# Patient Record
Sex: FEMALE | Race: BLACK OR AFRICAN AMERICAN | Employment: FULL TIME | URBAN - METROPOLITAN AREA
[De-identification: names, ages, dates, MRNs, and addresses within clinical notes are randomized per-mention and may not be internally consistent; named-entity substitution may affect disease eponyms.]

---

## 2019-09-13 LAB
CHLAMYDIA, EXTERNAL: NEGATIVE
HBSAG, EXTERNAL: NEGATIVE
HIV, EXTERNAL: NEGATIVE
N. GONORRHEA, EXTERNAL: NEGATIVE
RPR, EXTERNAL: NORMAL
RUBELLA, EXTERNAL: NORMAL

## 2019-10-24 PROBLEM — Z34.91 SUPERVISION OF NORMAL PREGNANCY IN FIRST TRIMESTER: Status: ACTIVE | Noted: 2019-10-24

## 2019-12-20 PROBLEM — O44.20 PARTIAL PREVIA: Status: ACTIVE | Noted: 2019-12-20

## 2019-12-24 ENCOUNTER — HOSPITAL ENCOUNTER (EMERGENCY)
Age: 29
Discharge: HOME | End: 2019-12-25
Attending: EMERGENCY MEDICINE
Payer: COMMERCIAL

## 2019-12-24 DIAGNOSIS — R51.9 PREGNANCY HEADACHE IN SECOND TRIMESTER: Primary | ICD-10-CM

## 2019-12-24 DIAGNOSIS — O26.892 PREGNANCY HEADACHE IN SECOND TRIMESTER: Primary | ICD-10-CM

## 2019-12-24 LAB
ALBUMIN SERPL-MCNC: 3.1 G/DL (ref 3.4–5)
ALBUMIN/GLOB SERPL: 0.8 {RATIO} (ref 0.8–1.7)
ALP SERPL-CCNC: 36 U/L (ref 45–117)
ALT SERPL-CCNC: 16 U/L (ref 13–56)
ANION GAP SERPL CALC-SCNC: 9 MMOL/L (ref 3–18)
APPEARANCE UR: CLEAR
AST SERPL-CCNC: 17 U/L (ref 10–38)
BASOPHILS # BLD: 0 K/UL (ref 0–0.1)
BASOPHILS NFR BLD: 0 % (ref 0–2)
BILIRUB SERPL-MCNC: 0.2 MG/DL (ref 0.2–1)
BILIRUB UR QL: NEGATIVE
BUN SERPL-MCNC: 8 MG/DL (ref 7–18)
BUN/CREAT SERPL: 13 (ref 12–20)
CALCIUM SERPL-MCNC: 8.8 MG/DL (ref 8.5–10.1)
CHLORIDE SERPL-SCNC: 107 MMOL/L (ref 100–111)
CO2 SERPL-SCNC: 22 MMOL/L (ref 21–32)
COLOR UR: YELLOW
CREAT SERPL-MCNC: 0.6 MG/DL (ref 0.6–1.3)
DIFFERENTIAL METHOD BLD: ABNORMAL
EOSINOPHIL # BLD: 0 K/UL (ref 0–0.4)
EOSINOPHIL NFR BLD: 0 % (ref 0–5)
ERYTHROCYTE [DISTWIDTH] IN BLOOD BY AUTOMATED COUNT: 12.9 % (ref 11.6–14.5)
GLOBULIN SER CALC-MCNC: 4 G/DL (ref 2–4)
GLUCOSE SERPL-MCNC: 76 MG/DL (ref 74–99)
GLUCOSE UR STRIP.AUTO-MCNC: NEGATIVE MG/DL
HCT VFR BLD AUTO: 36.7 % (ref 35–45)
HGB BLD-MCNC: 12.2 G/DL (ref 12–16)
HGB UR QL STRIP: NEGATIVE
KETONES UR QL STRIP.AUTO: NEGATIVE MG/DL
LEUKOCYTE ESTERASE UR QL STRIP.AUTO: NEGATIVE
LYMPHOCYTES # BLD: 3.2 K/UL (ref 0.9–3.6)
LYMPHOCYTES NFR BLD: 36 % (ref 21–52)
MCH RBC QN AUTO: 30.5 PG (ref 24–34)
MCHC RBC AUTO-ENTMCNC: 33.2 G/DL (ref 31–37)
MCV RBC AUTO: 91.8 FL (ref 74–97)
MONOCYTES # BLD: 0.6 K/UL (ref 0.05–1.2)
MONOCYTES NFR BLD: 7 % (ref 3–10)
NEUTS SEG # BLD: 5 K/UL (ref 1.8–8)
NEUTS SEG NFR BLD: 57 % (ref 40–73)
NITRITE UR QL STRIP.AUTO: NEGATIVE
PH UR STRIP: 6 [PH] (ref 5–8)
PLATELET # BLD AUTO: 148 K/UL (ref 135–420)
PMV BLD AUTO: 12 FL (ref 9.2–11.8)
POTASSIUM SERPL-SCNC: 3.9 MMOL/L (ref 3.5–5.5)
PROT SERPL-MCNC: 7.1 G/DL (ref 6.4–8.2)
PROT UR STRIP-MCNC: NEGATIVE MG/DL
RBC # BLD AUTO: 4 M/UL (ref 4.2–5.3)
SODIUM SERPL-SCNC: 138 MMOL/L (ref 136–145)
SP GR UR REFRACTOMETRY: 1 (ref 1–1.03)
UROBILINOGEN UR QL STRIP.AUTO: 0.2 EU/DL (ref 0.2–1)
WBC # BLD AUTO: 8.8 K/UL (ref 4.6–13.2)

## 2019-12-24 PROCEDURE — 96375 TX/PRO/DX INJ NEW DRUG ADDON: CPT

## 2019-12-24 PROCEDURE — 81003 URINALYSIS AUTO W/O SCOPE: CPT

## 2019-12-24 PROCEDURE — 85025 COMPLETE CBC W/AUTO DIFF WBC: CPT

## 2019-12-24 PROCEDURE — 74011250636 HC RX REV CODE- 250/636: Performed by: EMERGENCY MEDICINE

## 2019-12-24 PROCEDURE — 96365 THER/PROPH/DIAG IV INF INIT: CPT

## 2019-12-24 PROCEDURE — 80053 COMPREHEN METABOLIC PANEL: CPT

## 2019-12-24 PROCEDURE — 99284 EMERGENCY DEPT VISIT MOD MDM: CPT

## 2019-12-24 PROCEDURE — 96361 HYDRATE IV INFUSION ADD-ON: CPT

## 2019-12-24 RX ORDER — DIPHENHYDRAMINE HYDROCHLORIDE 50 MG/ML
50 INJECTION, SOLUTION INTRAMUSCULAR; INTRAVENOUS
Status: COMPLETED | OUTPATIENT
Start: 2019-12-24 | End: 2019-12-24

## 2019-12-24 RX ORDER — METOCLOPRAMIDE HYDROCHLORIDE 5 MG/ML
10 INJECTION INTRAMUSCULAR; INTRAVENOUS
Status: COMPLETED | OUTPATIENT
Start: 2019-12-24 | End: 2019-12-24

## 2019-12-24 RX ORDER — ACETAMINOPHEN 10 MG/ML
1000 INJECTION, SOLUTION INTRAVENOUS ONCE
Status: COMPLETED | OUTPATIENT
Start: 2019-12-24 | End: 2019-12-24

## 2019-12-24 RX ADMIN — ACETAMINOPHEN 1000 MG: 10 INJECTION, SOLUTION INTRAVENOUS at 22:43

## 2019-12-24 RX ADMIN — SODIUM CHLORIDE 1000 ML: 900 INJECTION, SOLUTION INTRAVENOUS at 22:43

## 2019-12-24 RX ADMIN — DIPHENHYDRAMINE HYDROCHLORIDE 50 MG: 50 INJECTION INTRAMUSCULAR; INTRAVENOUS at 23:43

## 2019-12-24 RX ADMIN — METOCLOPRAMIDE 10 MG: 5 INJECTION, SOLUTION INTRAMUSCULAR; INTRAVENOUS at 22:43

## 2019-12-25 ENCOUNTER — APPOINTMENT (OUTPATIENT)
Dept: CT IMAGING | Age: 29
End: 2019-12-25
Attending: EMERGENCY MEDICINE
Payer: COMMERCIAL

## 2019-12-25 VITALS
HEIGHT: 64 IN | WEIGHT: 140 LBS | OXYGEN SATURATION: 100 % | SYSTOLIC BLOOD PRESSURE: 106 MMHG | TEMPERATURE: 97.4 F | RESPIRATION RATE: 18 BRPM | BODY MASS INDEX: 23.9 KG/M2 | HEART RATE: 80 BPM | DIASTOLIC BLOOD PRESSURE: 64 MMHG

## 2019-12-25 PROBLEM — R51.9 PREGNANCY HEADACHE IN SECOND TRIMESTER: Status: ACTIVE | Noted: 2019-12-25

## 2019-12-25 PROBLEM — O26.892 PREGNANCY HEADACHE IN SECOND TRIMESTER: Status: ACTIVE | Noted: 2019-12-25

## 2019-12-25 PROCEDURE — 96372 THER/PROPH/DIAG INJ SC/IM: CPT

## 2019-12-25 PROCEDURE — 70450 CT HEAD/BRAIN W/O DYE: CPT

## 2019-12-25 PROCEDURE — 74011636637 HC RX REV CODE- 636/637: Performed by: EMERGENCY MEDICINE

## 2019-12-25 RX ORDER — SUMATRIPTAN 6 MG/.5ML
6 INJECTION, SOLUTION SUBCUTANEOUS ONCE
Status: COMPLETED | OUTPATIENT
Start: 2019-12-25 | End: 2019-12-25

## 2019-12-25 RX ADMIN — SUMATRIPTAN 6 MG: 6 INJECTION SUBCUTANEOUS at 00:18

## 2019-12-25 NOTE — ED PROVIDER NOTES
EMERGENCY DEPARTMENT HISTORY AND PHYSICAL EXAM    Date: 12/24/2019  Patient Name: Nicolas Valiente    History of Presenting Illness     Chief Complaint   Patient presents with    Headache         History Provided By: Patient      Nicolas Valiente is a 30 y.o. female who presents to the emergency department C/O headache during pregnancy. Patient states the headache is been going on for last 24 hours. She states she is approximately 22 weeks pregnant. She denies any abdominal pain, abdominal cramping, pelvic pain, vaginal bleeding or discharge or dysuria. States she has been having some nausea and vomiting and states that the headache is worse with bright lights and loud noises. She localizes the headache as a band surrounding her head. She states she does see OB/GYN Dr. Cyndie Lazar at [facility] and has had no complications during this pregnancy. Denies any blurry vision. PCP: None    Current Outpatient Medications   Medication Sig Dispense Refill    prenatal no. 44-iron,car-FA-dha (PRENATE MINI) 29 mg iron-1 mg -350 mg cap Take 1 Tab by mouth daily. Indications: LOT [id number] EXP 06/2019 9 Cap 0       Past History     Past Medical History:  Past Medical History:   Diagnosis Date    Anemia     Polydactyly of both hands        Past Surgical History:  No past surgical history on file. Family History:  Family History   Problem Relation Age of Onset    Diabetes Mother     Diabetes Maternal Grandmother        Social History:  Social History     Tobacco Use    Smoking status: Never Smoker    Smokeless tobacco: Never Used   Substance Use Topics    Alcohol use: Not Currently    Drug use: Never       Allergies: Allergies   Allergen Reactions    Apple Other (comments)         Review of Systems   Review of Systems   Constitutional: Negative for fever. Respiratory: Negative for shortness of breath. Cardiovascular: Negative for chest pain. Gastrointestinal: Negative for abdominal pain. Musculoskeletal: Negative for neck pain and neck stiffness. Neurological: Positive for headaches. Negative for dizziness, syncope and weakness. Physical Exam     Vitals:    12/24/19 2218 12/24/19 2248 12/25/19 0015   BP: 108/74  106/76   Pulse: 90  82   Resp: 18     Temp: 97.4 °F (36.3 °C)     SpO2: 99% 100% 100%   Weight: 63.5 kg (140 lb)     Height: 5' 4\" (1.626 m)       Physical Exam    Nursing notes and vital signs reviewed    Constitutional: Nontoxic but uncomfortable appearing in mild distress  HEENT: Normocephalic, Atraumatic, Pupils are equal, round, and reactive to light, EOMI  Cardiovascular: Regular rate and rhythm, no murmurs  Chest: Normal work of breathing and chest excursion bilaterally  Lungs: Clear to ausculation bilaterally  Abdomen: Soft, non tender, distention appropriate for approximately 22 weeks gestational age, normoactive bowel sounds  Back: No evidence of trauma or deformity  Extremities: No evidence of trauma or deformity, no LE edema  Skin: Warm and dry, normal cap refill  Neuro: Alert and oriented x 3, CN intact, normal speech, strength and sensation full and symmetric bilaterally, normal coordination, no focal deficits  Psychiatric: Patient is tearful      Diagnostic Study Results     Labs -     Recent Results (from the past 72 hour(s))   CBC WITH AUTOMATED DIFF    Collection Time: 12/24/19 10:45 PM   Result Value Ref Range    WBC 8.8 4.6 - 13.2 K/uL    RBC 4.00 (L) 4.20 - 5.30 M/uL    HGB 12.2 12.0 - 16.0 g/dL    HCT 36.7 35.0 - 45.0 %    MCV 91.8 74.0 - 97.0 FL    MCH 30.5 24.0 - 34.0 PG    MCHC 33.2 31.0 - 37.0 g/dL    RDW 12.9 11.6 - 14.5 %    PLATELET 047 286 - 952 K/uL    MPV 12.0 (H) 9.2 - 11.8 FL    NEUTROPHILS 57 40 - 73 %    LYMPHOCYTES 36 21 - 52 %    MONOCYTES 7 3 - 10 %    EOSINOPHILS 0 0 - 5 %    BASOPHILS 0 0 - 2 %    ABS. NEUTROPHILS 5.0 1.8 - 8.0 K/UL    ABS. LYMPHOCYTES 3.2 0.9 - 3.6 K/UL    ABS. MONOCYTES 0.6 0.05 - 1.2 K/UL    ABS.  EOSINOPHILS 0.0 0.0 - 0.4 K/UL    ABS. BASOPHILS 0.0 0.0 - 0.1 K/UL    DF AUTOMATED     METABOLIC PANEL, COMPREHENSIVE    Collection Time: 12/24/19 10:45 PM   Result Value Ref Range    Sodium 138 136 - 145 mmol/L    Potassium 3.9 3.5 - 5.5 mmol/L    Chloride 107 100 - 111 mmol/L    CO2 22 21 - 32 mmol/L    Anion gap 9 3.0 - 18 mmol/L    Glucose 76 74 - 99 mg/dL    BUN 8 7.0 - 18 MG/DL    Creatinine 0.60 0.6 - 1.3 MG/DL    BUN/Creatinine ratio 13 12 - 20      GFR est AA >60 >60 ml/min/1.73m2    GFR est non-AA >60 >60 ml/min/1.73m2    Calcium 8.8 8.5 - 10.1 MG/DL    Bilirubin, total 0.2 0.2 - 1.0 MG/DL    ALT (SGPT) 16 13 - 56 U/L    AST (SGOT) 17 10 - 38 U/L    Alk. phosphatase 36 (L) 45 - 117 U/L    Protein, total 7.1 6.4 - 8.2 g/dL    Albumin 3.1 (L) 3.4 - 5.0 g/dL    Globulin 4.0 2.0 - 4.0 g/dL    A-G Ratio 0.8 0.8 - 1.7     URINALYSIS W/ RFLX MICROSCOPIC    Collection Time: 12/24/19 11:00 PM   Result Value Ref Range    Color YELLOW      Appearance CLEAR      Specific gravity 1.005 1.005 - 1.030      pH (UA) 6.0 5.0 - 8.0      Protein NEGATIVE  NEG mg/dL    Glucose NEGATIVE  NEG mg/dL    Ketone NEGATIVE  NEG mg/dL    Bilirubin NEGATIVE  NEG      Blood NEGATIVE  NEG      Urobilinogen 0.2 0.2 - 1.0 EU/dL    Nitrites NEGATIVE  NEG      Leukocyte Esterase NEGATIVE  NEG         Radiologic Studies -   CT HEAD WO CONT   Final Result   IMPRESSION:      No acute intracranial abnormalities. CT Results  (Last 48 hours)               12/25/19 0122  CT HEAD WO CONT Final result    Impression:  IMPRESSION:       No acute intracranial abnormalities. Narrative:  EXAM: CT head       INDICATION: Headache. COMPARISON: None. TECHNIQUE: Axial CT imaging of the head was performed without intravenous   contrast. Dose reduction techniques used: automated exposure control, adjustment   of the mAs and/or kVp according to patient size, and iterative reconstruction   techniques.        _______________       FINDINGS:       BRAIN AND POSTERIOR FOSSA: The sulci, folia, ventricles and basal cisterns are   within normal limits for the patient's age. There is no intracranial hemorrhage,   mass effect, or midline shift. There are no areas of abnormal parenchymal   attenuation. EXTRA-AXIAL SPACES AND MENINGES: There are no abnormal extra-axial fluid   collections. CALVARIUM: Intact. SINUSES: Clear. OTHER: None.       _______________               CXR Results  (Last 48 hours)    None          Medications given in the ED-  Medications   sodium chloride 0.9 % bolus infusion 1,000 mL (0 mL IntraVENous IV Completed 12/25/19 0018)   acetaminophen (OFIRMEV) infusion 1,000 mg (0 mg IntraVENous IV Completed 12/24/19 2304)   metoclopramide HCl (REGLAN) injection 10 mg (10 mg IntraVENous Given 12/24/19 2243)   diphenhydrAMINE (BENADRYL) injection 50 mg (50 mg IntraVENous Given 12/24/19 2343)   SUMAtriptan (IMITREX) injection 6 mg (6 mg SubCUTAneous Given 12/25/19 0018)         Medical Decision Making   I am the first provider for this patient. I reviewed the vital signs, available nursing notes, past medical history, past surgical history, family history and social history. Vital Signs-Reviewed the patient's vital signs. Pulse Oximetry Analysis - 100% on room air     Cardiac Monitor:  Rate: 90 bpm  Rhythm: sinus    Records Reviewed: Nursing Notes    Procedures:  Procedures    ED Course:   Patient was given 1 L fluid bolus, 1 g of Tylenol, 10 mg of Reglan and 50 mg of Benadryl. Patient states the headache is a little bit better but still pretty bad. At this time will obtain CT scan to rule out any potential intracranial process and try subcutaneous Imitrex 6 mg    CT scan normal, laboratory findings unremarkable, urinary studies unremarkable. Patient states her headache is improving with the Imitrex and feels comfortable going home. I discussed with the patient that there are symptoms appear as a migraine headache. Recommended to continue with rest, fluid rehydration, over-the-counter Tylenol as well as any other prescription medications as directed. Recommended to follow-up with their primary care physician and/or neurologist for reevaluation and long-term management of their headaches. Recommended to come back to the emergency department the patient develops worsening other symptoms or signs of numbness or weakness. They understand and agree to plan. Diagnosis and Disposition     DISCHARGE NOTE:    Avni Marquis  results have been reviewed with her. She has been counseled regarding her diagnosis, treatment, and plan. She verbally conveys understanding and agreement of the signs, symptoms, diagnosis, treatment and prognosis and additionally agrees to follow up as discussed. She also agrees with the care-plan and conveys that all of her questions have been answered. I have also provided discharge instructions for her that include: educational information regarding their diagnosis and treatment, and list of reasons why they would want to return to the ED prior to their follow-up appointment, should her condition change. She has been provided with education for proper emergency department utilization. CLINICAL IMPRESSION:    1. Pregnancy headache in second trimester        PLAN:  1. D/C Home  2. Current Discharge Medication List        3. Follow-up Information     Follow up With Specialties Details Why Contact Info    Your obgyn  Schedule an appointment as soon as possible for a visit in 2 days As needed     THE [facility] EMERGENCY DEPT Emergency Medicine Go to  If symptoms worsen [address] [facility] [id number] [phone]        _______________________________      Please note that this dictation was completed with [employer], the computer voice recognition software.   Quite often unanticipated grammatical, syntax, homophones, and other interpretive errors are inadvertently transcribed by the computer software. Please disregard these errors. Please excuse any errors that have escaped final proofreading.

## 2019-12-25 NOTE — ED TRIAGE NOTES
Pt c/o H/A all day;  C/o nausea;   No h/o migraines;  Pt 22 weeks pregnant; OB ANJUM'brian at [facility]

## 2020-03-30 ENCOUNTER — ANESTHESIA EVENT (OUTPATIENT)
Dept: LABOR AND DELIVERY | Age: 30
End: 2020-03-30
Payer: COMMERCIAL

## 2020-03-31 ENCOUNTER — ANESTHESIA (OUTPATIENT)
Dept: LABOR AND DELIVERY | Age: 30
End: 2020-03-31
Payer: COMMERCIAL

## 2020-03-31 ENCOUNTER — HOSPITAL ENCOUNTER (INPATIENT)
Age: 30
LOS: 2 days | Discharge: HOME | End: 2020-04-02
Attending: OBSTETRICS & GYNECOLOGY | Admitting: OBSTETRICS & GYNECOLOGY
Payer: COMMERCIAL

## 2020-03-31 DIAGNOSIS — Z98.891 S/P CESAREAN SECTION: Primary | ICD-10-CM

## 2020-03-31 PROBLEM — O44.03 PLACENTA PREVIA ANTEPARTUM IN THIRD TRIMESTER: Status: ACTIVE | Noted: 2020-03-31

## 2020-03-31 LAB
BASOPHILS # BLD: 0 K/UL (ref 0–0.1)
BASOPHILS NFR BLD: 0 % (ref 0–2)
DIFFERENTIAL METHOD BLD: ABNORMAL
EOSINOPHIL # BLD: 0.1 K/UL (ref 0–0.4)
EOSINOPHIL NFR BLD: 1 % (ref 0–5)
ERYTHROCYTE [DISTWIDTH] IN BLOOD BY AUTOMATED COUNT: 13.3 % (ref 11.6–14.5)
HCT VFR BLD AUTO: 36.4 % (ref 35–45)
HGB BLD-MCNC: 11.9 G/DL (ref 12–16)
LYMPHOCYTES # BLD: 2.7 K/UL (ref 0.9–3.6)
LYMPHOCYTES NFR BLD: 43 % (ref 21–52)
MCH RBC QN AUTO: 30.6 PG (ref 24–34)
MCHC RBC AUTO-ENTMCNC: 32.7 G/DL (ref 31–37)
MCV RBC AUTO: 93.6 FL (ref 74–97)
MONOCYTES # BLD: 0.4 K/UL (ref 0.05–1.2)
MONOCYTES NFR BLD: 6 % (ref 3–10)
NEUTS SEG # BLD: 3.1 K/UL (ref 1.8–8)
NEUTS SEG NFR BLD: 50 % (ref 40–73)
PLATELET # BLD AUTO: 111 K/UL (ref 135–420)
PMV BLD AUTO: 13.6 FL (ref 9.2–11.8)
RBC # BLD AUTO: 3.89 M/UL (ref 4.2–5.3)
WBC # BLD AUTO: 6.3 K/UL (ref 4.6–13.2)

## 2020-03-31 PROCEDURE — 77030027138 HC INCENT SPIROMETER -A

## 2020-03-31 PROCEDURE — 75410000003 HC RECOV DEL/VAG/CSECN EA 0.5 HR

## 2020-03-31 PROCEDURE — 77030031139 HC SUT VCRL2 J&J -A: Performed by: OBSTETRICS & GYNECOLOGY

## 2020-03-31 PROCEDURE — 59025 FETAL NON-STRESS TEST: CPT

## 2020-03-31 PROCEDURE — 86900 BLOOD TYPING SEROLOGIC ABO: CPT

## 2020-03-31 PROCEDURE — 74011250636 HC RX REV CODE- 250/636

## 2020-03-31 PROCEDURE — 77030011265 HC ELECTRD BLD HEX COVD -A: Performed by: OBSTETRICS & GYNECOLOGY

## 2020-03-31 PROCEDURE — 77030040361 HC SLV COMPR DVT MDII -B: Performed by: OBSTETRICS & GYNECOLOGY

## 2020-03-31 PROCEDURE — 74011250636 HC RX REV CODE- 250/636: Performed by: OBSTETRICS & GYNECOLOGY

## 2020-03-31 PROCEDURE — 77030039266 HC ADH SKN EXOFIN S2SG -A: Performed by: OBSTETRICS & GYNECOLOGY

## 2020-03-31 PROCEDURE — 77030040361 HC SLV COMPR DVT MDII -B

## 2020-03-31 PROCEDURE — 76060000032 HC ANESTHESIA 0.5 TO 1 HR: Performed by: OBSTETRICS & GYNECOLOGY

## 2020-03-31 PROCEDURE — 75410000003 HC RECOV DEL/VAG/CSECN EA 0.5 HR: Performed by: OBSTETRICS & GYNECOLOGY

## 2020-03-31 PROCEDURE — 74011250636 HC RX REV CODE- 250/636: Performed by: ANESTHESIOLOGY

## 2020-03-31 PROCEDURE — 77030018836 HC SOL IRR NACL ICUM -A: Performed by: OBSTETRICS & GYNECOLOGY

## 2020-03-31 PROCEDURE — 65270000029 HC RM PRIVATE

## 2020-03-31 PROCEDURE — 74011000250 HC RX REV CODE- 250: Performed by: ANESTHESIOLOGY

## 2020-03-31 PROCEDURE — 77030040830 HC CATH URETH FOL MDII -A

## 2020-03-31 PROCEDURE — 85025 COMPLETE CBC W/AUTO DIFF WBC: CPT

## 2020-03-31 PROCEDURE — 76010000391 HC C SECN FIRST 1 HR: Performed by: OBSTETRICS & GYNECOLOGY

## 2020-03-31 PROCEDURE — 87081 CULTURE SCREEN ONLY: CPT

## 2020-03-31 PROCEDURE — 77030010507 HC ADH SKN DERMBND J&J -B: Performed by: OBSTETRICS & GYNECOLOGY

## 2020-03-31 PROCEDURE — 76060000078 HC EPIDURAL ANESTHESIA

## 2020-03-31 PROCEDURE — 86920 COMPATIBILITY TEST SPIN: CPT

## 2020-03-31 PROCEDURE — 74011250637 HC RX REV CODE- 250/637: Performed by: OBSTETRICS & GYNECOLOGY

## 2020-03-31 PROCEDURE — 77030014144 HC TY SPN ANES BBMI -B: Performed by: ANESTHESIOLOGY

## 2020-03-31 PROCEDURE — 77030008459 HC STPLR SKN COOP -B: Performed by: OBSTETRICS & GYNECOLOGY

## 2020-03-31 PROCEDURE — 77030018809 HC RETRCTR ALXSO DISP AMR -B: Performed by: OBSTETRICS & GYNECOLOGY

## 2020-03-31 RX ORDER — SODIUM CHLORIDE, SODIUM LACTATE, POTASSIUM CHLORIDE, CALCIUM CHLORIDE 600; 310; 30; 20 MG/100ML; MG/100ML; MG/100ML; MG/100ML
125 INJECTION, SOLUTION INTRAVENOUS CONTINUOUS
Status: DISPENSED | OUTPATIENT
Start: 2020-03-31 | End: 2020-04-01

## 2020-03-31 RX ORDER — OXYCODONE AND ACETAMINOPHEN 5; 325 MG/1; MG/1
1-2 TABLET ORAL
Status: DISCONTINUED | OUTPATIENT
Start: 2020-03-31 | End: 2020-04-02 | Stop reason: HOSPADM

## 2020-03-31 RX ORDER — ONDANSETRON 2 MG/ML
INJECTION INTRAMUSCULAR; INTRAVENOUS AS NEEDED
Status: DISCONTINUED | OUTPATIENT
Start: 2020-03-31 | End: 2020-03-31 | Stop reason: HOSPADM

## 2020-03-31 RX ORDER — FENTANYL CITRATE 50 UG/ML
INJECTION, SOLUTION INTRAMUSCULAR; INTRAVENOUS
Status: COMPLETED | OUTPATIENT
Start: 2020-03-31 | End: 2020-03-31

## 2020-03-31 RX ORDER — SODIUM CHLORIDE 0.9 % (FLUSH) 0.9 %
5-40 SYRINGE (ML) INJECTION AS NEEDED
Status: DISCONTINUED | OUTPATIENT
Start: 2020-03-31 | End: 2020-04-02 | Stop reason: HOSPADM

## 2020-03-31 RX ORDER — CEFAZOLIN SODIUM 2 G/50ML
2 SOLUTION INTRAVENOUS ONCE
Status: COMPLETED | OUTPATIENT
Start: 2020-03-31 | End: 2020-03-31

## 2020-03-31 RX ORDER — FACIAL-BODY WIPES
10 EACH TOPICAL
Status: DISCONTINUED | OUTPATIENT
Start: 2020-03-31 | End: 2020-04-02 | Stop reason: HOSPADM

## 2020-03-31 RX ORDER — SODIUM CHLORIDE, SODIUM LACTATE, POTASSIUM CHLORIDE, CALCIUM CHLORIDE 600; 310; 30; 20 MG/100ML; MG/100ML; MG/100ML; MG/100ML
125 INJECTION, SOLUTION INTRAVENOUS CONTINUOUS
Status: DISCONTINUED | OUTPATIENT
Start: 2020-03-31 | End: 2020-03-31 | Stop reason: HOSPADM

## 2020-03-31 RX ORDER — BUPIVACAINE HYDROCHLORIDE 7.5 MG/ML
INJECTION, SOLUTION INTRASPINAL
Status: COMPLETED | OUTPATIENT
Start: 2020-03-31 | End: 2020-03-31

## 2020-03-31 RX ORDER — ZOLPIDEM TARTRATE 5 MG/1
5 TABLET ORAL
Status: DISCONTINUED | OUTPATIENT
Start: 2020-03-31 | End: 2020-04-02 | Stop reason: HOSPADM

## 2020-03-31 RX ORDER — SODIUM CHLORIDE 9 MG/ML
250 INJECTION, SOLUTION INTRAVENOUS AS NEEDED
Status: DISCONTINUED | OUTPATIENT
Start: 2020-03-31 | End: 2020-03-31 | Stop reason: HOSPADM

## 2020-03-31 RX ORDER — IBUPROFEN 400 MG/1
800 TABLET ORAL
Status: DISCONTINUED | OUTPATIENT
Start: 2020-04-03 | End: 2020-04-01

## 2020-03-31 RX ORDER — PHENYLEPHRINE HCL IN 0.9% NACL 1 MG/10 ML
SYRINGE (ML) INTRAVENOUS AS NEEDED
Status: DISCONTINUED | OUTPATIENT
Start: 2020-03-31 | End: 2020-03-31 | Stop reason: HOSPADM

## 2020-03-31 RX ORDER — KETOROLAC TROMETHAMINE 30 MG/ML
30 INJECTION, SOLUTION INTRAMUSCULAR; INTRAVENOUS EVERY 6 HOURS
Status: DISCONTINUED | OUTPATIENT
Start: 2020-03-31 | End: 2020-04-01

## 2020-03-31 RX ORDER — OXYTOCIN/0.9 % SODIUM CHLORIDE 20/1000 ML
125 PLASTIC BAG, INJECTION (ML) INTRAVENOUS CONTINUOUS
Status: DISCONTINUED | OUTPATIENT
Start: 2020-03-31 | End: 2020-04-02 | Stop reason: HOSPADM

## 2020-03-31 RX ORDER — SODIUM CHLORIDE 0.9 % (FLUSH) 0.9 %
5-40 SYRINGE (ML) INJECTION EVERY 8 HOURS
Status: DISCONTINUED | OUTPATIENT
Start: 2020-03-31 | End: 2020-04-02 | Stop reason: HOSPADM

## 2020-03-31 RX ORDER — PROMETHAZINE HYDROCHLORIDE 25 MG/ML
25 INJECTION, SOLUTION INTRAMUSCULAR; INTRAVENOUS
Status: DISCONTINUED | OUTPATIENT
Start: 2020-03-31 | End: 2020-04-02 | Stop reason: HOSPADM

## 2020-03-31 RX ORDER — SIMETHICONE 80 MG
80 TABLET,CHEWABLE ORAL
Status: DISCONTINUED | OUTPATIENT
Start: 2020-03-31 | End: 2020-04-02 | Stop reason: HOSPADM

## 2020-03-31 RX ORDER — MORPHINE SULFATE 1 MG/ML
INJECTION, SOLUTION EPIDURAL; INTRATHECAL; INTRAVENOUS
Status: COMPLETED | OUTPATIENT
Start: 2020-03-31 | End: 2020-03-31

## 2020-03-31 RX ORDER — ACETAMINOPHEN 325 MG/1
650 TABLET ORAL
Status: DISCONTINUED | OUTPATIENT
Start: 2020-03-31 | End: 2020-04-02 | Stop reason: HOSPADM

## 2020-03-31 RX ORDER — OXYTOCIN/RINGER'S LACTATE 20/1000 ML
PLASTIC BAG, INJECTION (ML) INTRAVENOUS
Status: DISCONTINUED | OUTPATIENT
Start: 2020-03-31 | End: 2020-03-31 | Stop reason: HOSPADM

## 2020-03-31 RX ORDER — OXYTOCIN/0.9 % SODIUM CHLORIDE 20/1000 ML
PLASTIC BAG, INJECTION (ML) INTRAVENOUS
Status: COMPLETED
Start: 2020-03-31 | End: 2020-03-31

## 2020-03-31 RX ADMIN — Medication 125 ML/HR: at 10:30

## 2020-03-31 RX ADMIN — ONDANSETRON HYDROCHLORIDE 4 MG: 2 INJECTION INTRAMUSCULAR; INTRAVENOUS at 09:32

## 2020-03-31 RX ADMIN — FENTANYL CITRATE 25 MCG: 50 INJECTION, SOLUTION INTRAMUSCULAR; INTRAVENOUS at 09:14

## 2020-03-31 RX ADMIN — SODIUM CHLORIDE, SODIUM LACTATE, POTASSIUM CHLORIDE, AND CALCIUM CHLORIDE: 600; 310; 30; 20 INJECTION, SOLUTION INTRAVENOUS at 09:08

## 2020-03-31 RX ADMIN — Medication 100 MCG: at 09:27

## 2020-03-31 RX ADMIN — KETOROLAC TROMETHAMINE 30 MG: 30 INJECTION, SOLUTION INTRAMUSCULAR at 10:48

## 2020-03-31 RX ADMIN — KETOROLAC TROMETHAMINE 30 MG: 30 INJECTION, SOLUTION INTRAMUSCULAR at 17:52

## 2020-03-31 RX ADMIN — SODIUM CHLORIDE, SODIUM LACTATE, POTASSIUM CHLORIDE, AND CALCIUM CHLORIDE 1000 ML: 600; 310; 30; 20 INJECTION, SOLUTION INTRAVENOUS at 07:30

## 2020-03-31 RX ADMIN — MORPHINE SULFATE 0.15 MG: 1 INJECTION, SOLUTION EPIDURAL; INTRATHECAL; INTRAVENOUS at 09:14

## 2020-03-31 RX ADMIN — Medication 200 MCG: at 09:18

## 2020-03-31 RX ADMIN — Medication 1000 ML/HR: at 09:30

## 2020-03-31 RX ADMIN — SODIUM CHLORIDE, SODIUM LACTATE, POTASSIUM CHLORIDE, AND CALCIUM CHLORIDE 125 ML/HR: 600; 310; 30; 20 INJECTION, SOLUTION INTRAVENOUS at 10:48

## 2020-03-31 RX ADMIN — BUPIVACAINE HYDROCHLORIDE IN DEXTROSE 15 MG: 7.5 INJECTION, SOLUTION SUBARACHNOID at 09:14

## 2020-03-31 RX ADMIN — OXYCODONE HYDROCHLORIDE AND ACETAMINOPHEN 2 TABLET: 5; 325 TABLET ORAL at 15:33

## 2020-03-31 RX ADMIN — CEFAZOLIN SODIUM 2 G: 2 SOLUTION INTRAVENOUS at 09:10

## 2020-03-31 NOTE — OP NOTES
[address] [facility]                              [address] [facility] [place] [address]                               OPERATIVE REPORT    PATIENT:       Rakel Camara    MRN:              [id number]   D ATE:            3/31/2020      PREOPERATIVE DIAGNOSES  1. Intrauterine pregnancy at 36 weeks  2. Placenta previa    POSTOPERATIVE DIAGNOSES  Same    PROCEDURE PERFORMED: Primary low-transverse  section of a viable Male  infant. Assistant: Circ-1: Мария Jeffries  Scrub Tech-1: Yovanny Castaneda  Scrub Tech-2: Good Barraza  Scrub RN-2: Zechariah French RN      SURGEON: Duglas Kelley MD    ANESTHESIA:  Spinal Dr. Anabella Guthrie: 500 mL. SPECIMEN: None  IMPLANTS: None    FINDINGS: A viable Male infant   delivered from the vertex presentation at 0929, Apgar's 8 and 9, and a weight of 6 lb. 8 oz. There was clear  amniotic fluid with normal tubes and ovaries bilaterally. COMPLICATIONS: None    DESCRIPTION OF PROCEDURE:  The patient was brought to the operating room and correctly identified. The patient received 2 g of intravenous  Ancef on call for the operating room. The patient was then placed in the  sitting position and a spinal was administered without difficulty by Dr. Dina Munoz. The patient was then placed in the supine position with a roll  under her right hip. Sequential compression boots were placed on her legs  and a Lucero catheter was sterilely placed, which drained clear yellow  urine. Fetal heart tones were normal prior to the start of the procedure. The  patient was sterilely prepped and draped. The spinal was tested and noted  to be adequate for anesthesia. A time out was performed prior to making the incision. Using the scalpel, a Pfannenstiel skin incision was made 3 fingerbreadths  above the symphysis pubis.  This was carried down  sharply and bluntly to the level of the fascia, which was nicked in the  midline and extended transversely with the Bovie. The superior and then  inferior rectus fascia was grasped with Kocher's, elevated and sharply and  bluntly dissected from the rectus muscles. The muscles were pulled  laterally to expose the peritoneum, which was bluntly entered. The  incision was extended transversely with the surgeon's fingers. The Denny O  retractor was placed into the peritoneal cavity. A bladder flap was  created with sharp and blunt dissection. The uterus was sharply incised  with a scalpel and extended superiorly and inferiorly with the surgeon's  fingers. There was clear amniotic fluid noted on entering the uterine  cavity. A viable Male was delivered from the vertex presentation at 0929  with Apgar's of 8 and 9 and a weight of 6 lb. 8 oz. The infant was  well suctioned. The cord was clamped and the infant was given to the  nursery staff. A cord blood specimen was obtained. The placenta was  then manually extracted from the uterine cavity and the cavity was wiped  clean with a wet sponge. Intravenous Pitocin was administered. The uterus  was closed in 2 layers, the first a continuous, interlocking suture of 1 monocryl, and the second an imbricating suture of 1 monocryl. The bladder  flap was closed using a continuous running suture of 2-0 Vicryl. The ovaries and tubes were inspected and noted to be normal bilaterally and the  gutters were wiped free of blood. The Denny O retractor was removed from  the peritoneal cavity. The fascia was closed in one length using a  continuous running suture of #1 vicryl. The skin was closed with  Insorb staples. The incision was cleaned and dried and exofin was placed  on the skin. At the end of the procedure, all sharps, sponge, and instrument counts were  correct, and the Lucero was draining clear yellow urine.     The patient tolerated the procedure well and she was taken to the recovery  room in stable condition.                                  Preliminary Unless Jenny New MD        KKO:wmx  D: 05/07/2012 12:56 P   T: 05/07/2012  1:47 P  BY:  [id number]  [facility]#:  [id number]  [facility] #:  [id number]    cc:   Terrance Candelaria MD

## 2020-03-31 NOTE — ANESTHESIA PROCEDURE NOTES
Spinal Block    Start time: 3/31/2020 9:11 AM  End time: 3/31/2020 9:15 AM  Performed by: Josue Rojas MD  Authorized by: Josue Rojas MD     Pre-procedure: Indications: at surgeon's request, post-op pain management and primary anesthetic  Preanesthetic Checklist: patient identified, risks and benefits discussed, anesthesia consent, site marked, patient being monitored and timeout performed    Timeout Time: 09:11          Spinal Block:   Patient Position:  Seated  Prep Region:  Lumbar  Prep: chlorhexidine      Location:  L3-4  Technique:  Single shot        Needle:   Needle Type:   Mariel  Needle Gauge:  25 G  Attempts:  1      Events: CSF confirmed, no blood with aspiration and no paresthesia        Assessment:  Insertion:  Uncomplicated  Patient tolerance:  Patient tolerated the procedure well with no immediate complications

## 2020-03-31 NOTE — ANESTHESIA POSTPROCEDURE EVALUATION
Post-Anesthesia Evaluation and Assessment    Cardiovascular Function/Vital Signs  Visit Vitals  /61   Pulse 70   Temp 36.5 °C (97.7 °F)   Resp 15   Ht 5' 4\" (1.626 m)   Wt 72.1 kg (159 lb)   SpO2 100%   Breastfeeding Unknown   BMI 27.29 kg/m²       Patient is status post Procedure(s):  PRIMARY  SECTION. Nausea/Vomiting: Controlled. Postoperative hydration reviewed and adequate. Pain:  Pain Scale 1: Numeric (0 - 10) (20 1130)  Pain Intensity 1: 5 (20 1130)   Managed. Neurological Status: At baseline. Mental Status and Level of Consciousness: Baseline and stable. Pulmonary Status:   O2 Device: Room air (20 1030)   Adequate oxygenation and airway patent. Complications related to anesthesia: None    Post-anesthesia assessment completed. No concerns. Patient has met all discharge requirements.     Signed By: Jana Kuo MD

## 2020-03-31 NOTE — PROGRESS NOTES
1620 - Received report from DEANDRE Zaragoza RN (precepting nurse). Assessment done on mom and baby at this time. Ice pack given to relieve abdominal pain. Antonio emptied. Instructed pt on how to order food. No further needs at this time. 1740 - pain reassessment done. Last round of frequent vital signs taken on baby. Mom still rates pain at a 5. Will check MAR.    1750 - Pt given toradol, per MAR. Pt still has some difficulty moving right leg, so antonio not to be removed at this time. No other needs at this time. 1915 - Bedside, Verbal and Written shift change report given to CHADWICK Montanez RN (oncoming nurse) by [employer]. ALEX Lora (offgoing nurse). Report included the following information SBAR, Procedure Summary, Intake/Output and MAR.

## 2020-03-31 NOTE — LACTATION NOTE
This note was copied from a baby's chart. 18 per mom,  has been very sleepy and last fed was around 11am. RN has checked  blood sugar - WNL. Taught mom hand expressing. Spoonfed 1 spoon at 1700. Encouraged mom to keep  skin to skin. Will page for assistance. 2029 [facility] asked to come to room. Mom was currently hand expressing. Per mom, she had attempted to feed , but as soon as he was at the breast he would fall asleep. Spoon fed  1 spoon at .

## 2020-03-31 NOTE — PROGRESS NOTES
Problem: Patient Education: Go to Patient Education Activity  Goal: Patient/Family Education  Outcome: Progressing Towards Goal     Problem:  Delivery: Day of Delivery  Goal: Activity/Safety  Outcome: Progressing Towards Goal  Goal: Consults, if ordered  Outcome: Progressing Towards Goal  Goal: Diagnostic Test/Procedures  Outcome: Progressing Towards Goal  Goal: Nutrition/Diet  Outcome: Progressing Towards Goal  Goal: Discharge Planning  Outcome: Progressing Towards Goal  Goal: Medications  Outcome: Progressing Towards Goal  Goal: Respiratory  Outcome: Progressing Towards Goal  Goal: Treatments/Interventions/Procedures  Outcome: Progressing Towards Goal  Goal: Psychosocial  Outcome: Progressing Towards Goal  Goal: *Vital signs within defined limits  Outcome: Progressing Towards Goal  Goal: *Labs within defined limits  Outcome: Progressing Towards Goal  Goal: *Hemodynamically stable  Outcome: Progressing Towards Goal  Goal: *Optimal pain control at patient's stated goal  Outcome: Progressing Towards Goal  Goal: *Participates in infant care  Outcome: Progressing Towards Goal  Goal: *Demonstrates progressive activity  Outcome: Progressing Towards Goal  Goal: *Tolerating diet  Outcome: Progressing Towards Goal

## 2020-03-31 NOTE — ANESTHESIA PREPROCEDURE EVALUATION
Relevant Problems   No relevant active problems       Anesthetic History   No history of anesthetic complications            Review of Systems / Medical History  Patient summary reviewed, nursing notes reviewed and pertinent labs reviewed    Pulmonary  Within defined limits                 Neuro/Psych   Within defined limits           Cardiovascular  Within defined limits                     GI/Hepatic/Renal  Within defined limits              Endo/Other  Within defined limits           Other Findings              Physical Exam    Airway  Mallampati: II  TM Distance: 4 - 6 cm  Neck ROM: normal range of motion   Mouth opening: Normal     Cardiovascular  Regular rate and rhythm,  S1 and S2 normal,  no murmur, click, rub, or gallop             Dental  No notable dental hx       Pulmonary  Breath sounds clear to auscultation               Abdominal  GI exam deferred       Other Findings            Anesthetic Plan    ASA: 2  Anesthesia type: spinal          Induction: Intravenous  Anesthetic plan and risks discussed with: Patient

## 2020-03-31 NOTE — PROGRESS NOTES
0730:   36+3 weeks gestation ambulated to unit for scheduled primary  section due to placenta previa. Patient taken to BR1 and oriented to room and call bell, provided with gown, nasal antiseptic, abdominal prep, non-skid socks, hair cover, SCDs and patient placed on monitor. IV started in left hand 20g  Blood work sent down. 0800: Dr Erlinda Yung at bedside. GBS sent and pending    1597: Baby boy delivered via primary low transverse csection.   Skin closure with Insorb staples and exofin. 7788: Incision open to air. No drainage or odor. Fundus firm midline at Umbilicus. Scant lochia. 1000: Fundus firm and at U scant lochia    1015: Fundus firm at umbilicus. Scant lochia. . Pt breastfeeding with Андрей Browning assistance currently. 1045: Pt c/o 4/10 pain at incision site. Toradol administered. Pt currently breastfeeding with assistance from  [address]       0059: Fundus firm 1 below U. Scant vaginal bleeding. Pad changed. Dora care done. Pt c/o 5/10 pain. Pain medication offered. Pt refused at this time. Will wait until she eats something. Gave pt loreta crackers and monitoring for N/V.     1354: TRANSFER - OUT REPORT:    Verbal report given to [place] RN(name) on [facility]  being transferred to Postpartum (unit) for routine progression of care       Report consisted of patients Situation, Background, Assessment and   Recommendations(SBAR). Information from the following report(s) SBAR, Kardex, OR Summary, Procedure Summary, Intake/Output, MAR and Recent Results was reviewed with the receiving nurse.     Lines:   Peripheral IV 20 Left;Posterior Hand (Active)   Site Assessment Clean, dry, & intact 3/31/2020  9:37 AM   Phlebitis Assessment 0 3/31/2020  9:37 AM   Infiltration Assessment 0 3/31/2020  9:37 AM   Dressing Status Clean, dry, & intact 3/31/2020  9:37 AM   Dressing Type 4 X 4;Tape;Transparent 3/31/2020  8:20 AM   Hub Color/Line Status Pink 3/31/2020  8:20 AM   Alcohol Cap Used Yes 3/31/2020  8:20 AM        Opportunity for questions and clarification was provided.       Patient transported with:   Registered Nurse

## 2020-03-31 NOTE — H&P
History & Physical    Name: Emmanuelle Omer MRN: [id number]  SSN: [id number]    YOB: 1990  Age: 30 y.o. Sex: female      Subjective:     Estimated Date of Delivery: 20  OB History    Para Term  AB Living   1             SAB TAB Ectopic Molar Multiple Live Births                    # Outcome Date GA Lbr Jace/2nd Weight Sex Delivery Anes PTL Lv   1 Current                Ms. Loretta Munguia admitted with pregnancy at 36w3d for primary  section due to placenta previa. Prenatal course was complicated by placenta previa. Please see prenatal records for details. Patient had recent consult and ultrasound at [facility], delivery recommended by . Past Medical History:   Diagnosis Date    Anemia     Polydactyly of both hands      No past surgical history on file. Social History     Occupational History    Not on file   Tobacco Use    Smoking status: Never Smoker    Smokeless tobacco: Never Used   Substance and Sexual Activity    Alcohol use: Not Currently    Drug use: Never    Sexual activity: Yes     Partners: Male     Family History   Problem Relation Age of Onset    Diabetes Mother     Diabetes Maternal Grandmother        Allergies   Allergen Reactions    Apple Swelling     Of the throat     Prior to Admission medications    Medication Sig Start Date End Date Taking? Authorizing Provider   acetaminophen (TYLENOL) 325 mg tablet Take  by mouth every four (4) hours as needed for Pain. Provider, Historical   prenatal no. 44-iron,car-FA-dha (PRENATE MINI) 29 mg iron-1 mg -350 mg cap Take 1 Tab by mouth daily. Indications: LOT [id number] EXP 19   Jung, Cristo Hayes NP        Review of Systems: Pertinent items are noted in the History of Present Illness. Objective:     Vitals: There were no vitals filed for this visit. Physical Exam:  Patient without distress.   Heart: Regular rate and rhythm  Lung: clear to auscultation throughout lung fields, no wheezes, no rales, no rhonchi and normal respiratory effort  Abdomen: soft, nontender  Cervical Exam: deferred  Membranes:  Intact  Fetal Heart Rate: Reactive    Prenatal Labs:   No results found for: ABORH, RUBELLAEXT, GRBSEXT, HBSAGEXT, HIVEXT, RPREXT, GONNOEXT, CHLAMEXT, ABORHEXT, RUBELLAEXT, GRBSEXT, HBSAGEXT, HIVEXT, RPREXT, GONNOEXT, CHLAMEXT, ABORHEXT, RUBELLAEXT, GRBSEXT, HBSAGEXT, HIVEXT, RPREXT, GONNOEXT, CHLAMEXT      Impression/Plan:     Plan:  Admit for primary  section. Group B Strep was not yet obtained. Will collect swab today. Discussed the risks of surgery including the risks of bleeding, infection, deep vein thrombosis, and surgical injuries to internal organs including but not limited to the bowels, bladder, rectum, and female reproductive organs. The patient understands the risks; any and all questions were answered to the patient's satisfaction. The operative consents were reviewed and signed with Kulwant Garg RN present for the discussion. NPO.     Signed By:  Jessica Holley MD     2020

## 2020-04-01 LAB
BASOPHILS # BLD: 0 K/UL (ref 0–0.1)
BASOPHILS NFR BLD: 0 % (ref 0–2)
DIFFERENTIAL METHOD BLD: ABNORMAL
EOSINOPHIL # BLD: 0.1 K/UL (ref 0–0.4)
EOSINOPHIL NFR BLD: 1 % (ref 0–5)
ERYTHROCYTE [DISTWIDTH] IN BLOOD BY AUTOMATED COUNT: 13.3 % (ref 11.6–14.5)
HCT VFR BLD AUTO: 33.9 % (ref 35–45)
HGB BLD-MCNC: 11 G/DL (ref 12–16)
LYMPHOCYTES # BLD: 2.7 K/UL (ref 0.9–3.6)
LYMPHOCYTES NFR BLD: 31 % (ref 21–52)
MCH RBC QN AUTO: 30.5 PG (ref 24–34)
MCHC RBC AUTO-ENTMCNC: 32.4 G/DL (ref 31–37)
MCV RBC AUTO: 93.9 FL (ref 74–97)
MONOCYTES # BLD: 0.6 K/UL (ref 0.05–1.2)
MONOCYTES NFR BLD: 7 % (ref 3–10)
NEUTS SEG # BLD: 5.3 K/UL (ref 1.8–8)
NEUTS SEG NFR BLD: 61 % (ref 40–73)
PLATELET # BLD AUTO: 99 K/UL (ref 135–420)
PMV BLD AUTO: 13.7 FL (ref 9.2–11.8)
RBC # BLD AUTO: 3.61 M/UL (ref 4.2–5.3)
WBC # BLD AUTO: 8.6 K/UL (ref 4.6–13.2)

## 2020-04-01 PROCEDURE — 74011250637 HC RX REV CODE- 250/637: Performed by: OBSTETRICS & GYNECOLOGY

## 2020-04-01 PROCEDURE — 36415 COLL VENOUS BLD VENIPUNCTURE: CPT

## 2020-04-01 PROCEDURE — 85025 COMPLETE CBC W/AUTO DIFF WBC: CPT

## 2020-04-01 PROCEDURE — 65270000029 HC RM PRIVATE

## 2020-04-01 PROCEDURE — 74011250636 HC RX REV CODE- 250/636: Performed by: OBSTETRICS & GYNECOLOGY

## 2020-04-01 RX ORDER — IBUPROFEN 400 MG/1
800 TABLET ORAL
Status: DISCONTINUED | OUTPATIENT
Start: 2020-04-01 | End: 2020-04-02 | Stop reason: HOSPADM

## 2020-04-01 RX ORDER — IBUPROFEN 400 MG/1
800 TABLET ORAL
Status: DISCONTINUED | OUTPATIENT
Start: 2020-04-02 | End: 2020-04-01

## 2020-04-01 RX ADMIN — SODIUM CHLORIDE, SODIUM LACTATE, POTASSIUM CHLORIDE, AND CALCIUM CHLORIDE 125 ML/HR: 600; 310; 30; 20 INJECTION, SOLUTION INTRAVENOUS at 00:08

## 2020-04-01 RX ADMIN — OXYCODONE HYDROCHLORIDE AND ACETAMINOPHEN 2 TABLET: 5; 325 TABLET ORAL at 22:35

## 2020-04-01 RX ADMIN — KETOROLAC TROMETHAMINE 30 MG: 30 INJECTION, SOLUTION INTRAMUSCULAR at 12:26

## 2020-04-01 RX ADMIN — KETOROLAC TROMETHAMINE 30 MG: 30 INJECTION, SOLUTION INTRAMUSCULAR at 00:08

## 2020-04-01 RX ADMIN — KETOROLAC TROMETHAMINE 30 MG: 30 INJECTION, SOLUTION INTRAMUSCULAR at 06:29

## 2020-04-01 NOTE — PROGRESS NOTES
Problem: Patient Education: Go to Patient Education Activity  Goal: Patient/Family Education  Outcome: Progressing Towards Goal     Problem:  Delivery: Postpartum Day 1  Goal: Activity/Safety  Outcome: Progressing Towards Goal  Goal: Consults, if ordered  Outcome: Progressing Towards Goal  Goal: Diagnostic Test/Procedures  Outcome: Progressing Towards Goal  Goal: Nutrition/Diet  Outcome: Progressing Towards Goal  Goal: Discharge Planning  Outcome: Progressing Towards Goal  Goal: Medications  Outcome: Progressing Towards Goal  Goal: Respiratory  Outcome: Progressing Towards Goal  Goal: Treatments/Interventions/Procedures  Outcome: Progressing Towards Goal  Goal: Psychosocial  Outcome: Progressing Towards Goal  Goal: *Vital signs within defined limits  Outcome: Progressing Towards Goal  Goal: *Labs within defined limits  Outcome: Progressing Towards Goal  Goal: *Hemodynamically stable  Outcome: Progressing Towards Goal  Goal: *Optimal pain control at patient's stated goal  Outcome: Progressing Towards Goal  Goal: *Participates in infant care  Outcome: Progressing Towards Goal  Goal: *Demonstrates progressive activity  Outcome: Progressing Towards Goal  Goal: *Tolerating diet  Outcome: Progressing Towards Goal  Goal: *Performs self perineal care  Outcome: Progressing Towards Goal     Problem: Pain  Goal: *Control of Pain  Outcome: Progressing Towards Goal  Goal: *PALLIATIVE CARE:  Alleviation of Pain  Outcome: Progressing Towards Goal     Problem: Patient Education: Go to Patient Education Activity  Goal: Patient/Family Education  Outcome: Progressing Towards Goal     Problem: Falls - Risk of  Goal: *Absence of Falls  Description: Document Arlen Fall Risk and appropriate interventions in the flowsheet.   Outcome: Progressing Towards Goal  Note: Fall Risk Interventions:            Medication Interventions: Patient to call before getting OOB    Elimination Interventions: Call light in reach Problem: Patient Education: Go to Patient Education Activity  Goal: Patient/Family Education  Outcome: Progressing Towards Goal     Problem: Pressure Injury - Risk of  Goal: *Prevention of pressure injury  Description: Document Neri Scale and appropriate interventions in the flowsheet. Outcome: Progressing Towards Goal  Note: Pressure Injury Interventions:             Activity Interventions: Pressure redistribution bed/mattress(bed type)    Mobility Interventions: Pressure redistribution bed/mattress (bed type)    Nutrition Interventions: Offer support with meals,snacks and hydration                     Problem: Patient Education: Go to Patient Education Activity  Goal: Patient/Family Education  Outcome: Progressing Towards Goal

## 2020-04-01 NOTE — LACTATION NOTE
This note was copied from a baby's chart. 163 infant latched and nursing well when [facility] entered the room. Per mom,  was cir'd today and has been very sleepy. No additional questions at this time. Will return.

## 2020-04-01 NOTE — PROGRESS NOTES
Post-Operative  Day 1731 [address]     Assessment:   Hospital Problems  Date Reviewed: 3/31/2020          Codes Class Noted POA    S/P  section ICD-10-CM: Z98.891  ICD-9-CM: V45.89  3/31/2020 No        * (Principal) Partial previa ICD-10-CM: O44.20  ICD-9-CM: 641.10  2019 Yes    Overview Addendum 3/25/2020 12:17 PM by Carlos Castro RN     Repeat scan 28 weeks  20 previa still present  3/16/20 5lb 8oz 69% previa still present. MFM referral sent-Appt 3/26/20 at 0930                 Post-Op day 1, stable    Plan:   1. Routine post-operative care   2. The risks and benefits of the circumcision  procedure and anesthesia including: bleeding, infection, variability of cosmetic results were discussed at length with the mother. She is aware that future repeat procedures may be necessary. She gives informed consent to proceed as noted and her questions are answered. Information for the patient's :  Tai Ortega [[id number]]   , Low Transverse   Patient doing well without significant complaint. Nausea and vomiting resolved, tolerating diet, passing flatus, antonio has been removed and is voiding.     Current Facility-Administered Medications   Medication Dose Route Frequency    sodium chloride (NS) flush 5-40 mL  5-40 mL IntraVENous Q8H    oxytocin (PITOCIN) 20 units/1000 mL in 0.9% sodium chloride  125 mL/hr IntraVENous CONTINUOUS    lactated Ringers infusion  125 mL/hr IntraVENous CONTINUOUS    ketorolac (TORADOL) injection 30 mg  30 mg IntraVENous Q6H        Vitals:  Visit Vitals  /60 (BP 1 Location: Left arm, BP Patient Position: At rest)   Pulse 83   Temp 98.3 °F (36.8 °C)   Resp 17   Ht 5' 4\" (1.626 m)   Wt 159 lb (72.1 kg)   LMP 2019 (Approximate)   SpO2 98%   Breastfeeding Unknown   BMI 27.29 kg/m²     Temp (24hrs), Av °F (36.7 °C), Min:97.4 °F (36.3 °C), Max:98.3 °F (36.8 °C)      Last 24hr Input/Output:    Intake/Output Summary (Last 24 hours) at 4/1/2020 0939  Last data filed at 4/1/2020 0123  Gross per 24 hour   Intake 200 ml   Output 2750 ml   Net -2550 ml          Exam:        Patient without distress. Lungs clear. Abdomen, bowel sounds present, soft, expected tenderness, fundus firm, incision intact     Perineum normal lochia noted               Lower extremities are negative for swelling, cords or tenderness. Labs:   Lab Results   Component Value Date/Time    WBC 8.6 04/01/2020 03:35 AM    WBC 6.3 03/31/2020 07:30 AM    WBC 8.8 12/24/2019 10:45 PM    HGB 11.0 (L) 04/01/2020 03:35 AM    HGB 11.9 (L) 03/31/2020 07:30 AM    HGB 11.2 02/07/2020 12:33 PM    HCT 33.9 (L) 04/01/2020 03:35 AM    HCT 36.4 03/31/2020 07:30 AM    HCT 36.7 12/24/2019 10:45 PM    PLATELET 99 (L) 51/09/7673 03:35 AM    PLATELET 748 (L) 92/57/1707 07:30 AM    PLATELET 792 60/61/9109 10:45 PM       Recent Results (from the past 24 hour(s))   CBC WITH AUTOMATED DIFF    Collection Time: 04/01/20  3:35 AM   Result Value Ref Range    WBC 8.6 4.6 - 13.2 K/uL    RBC 3.61 (L) 4.20 - 5.30 M/uL    HGB 11.0 (L) 12.0 - 16.0 g/dL    HCT 33.9 (L) 35.0 - 45.0 %    MCV 93.9 74.0 - 97.0 FL    MCH 30.5 24.0 - 34.0 PG    MCHC 32.4 31.0 - 37.0 g/dL    RDW 13.3 11.6 - 14.5 %    PLATELET 99 (L) 537 - 420 K/uL    MPV 13.7 (H) 9.2 - 11.8 FL    NEUTROPHILS 61 40 - 73 %    LYMPHOCYTES 31 21 - 52 %    MONOCYTES 7 3 - 10 %    EOSINOPHILS 1 0 - 5 %    BASOPHILS 0 0 - 2 %    ABS. NEUTROPHILS 5.3 1.8 - 8.0 K/UL    ABS. LYMPHOCYTES 2.7 0.9 - 3.6 K/UL    ABS. MONOCYTES 0.6 0.05 - 1.2 K/UL    ABS. EOSINOPHILS 0.1 0.0 - 0.4 K/UL    ABS.  BASOPHILS 0.0 0.0 - 0.1 K/UL    DF AUTOMATED

## 2020-04-01 NOTE — PROGRESS NOTES
0710  Bedside and Verbal shift change report given to VISHAL Hedrick RN (oncoming nurse) by Rip Bobby RN (offgoing nurse). Report included the following information SBAR, Kardex, Intake/Output, MAR and Recent Results. 6834  Completed assessment, no further needs at this time. 1010  Rounded on patient, no further needs at this time. 1105  Rounded on patient, no further needs at this time. 1210  Rounded on patient, no further needs at this time. 1220  Assessed fundus and lochia. Gave scheduled toradol. Paged Dr. Octavio Linda because patient wants to discontinue toradol, have IV removed, and start PO motrin. 1202 [facility]  Dr. Octavio Linda called back and gave order to D/C toradol and start PO motrin. 1320  Reassessed pain, no further needs at this time. 1600  Completed assessment and VS. Removed IV. No further needs at this time. 1705  Rounded on patient, no further needs at this time. 1735  Rounded on patient, no further needs at this time. 1812  Rounded on patient, no further needs at this time. 1905  Bedside and Verbal shift change report given to CHADWICK Albert, RN (oncoming nurse) by VISHAL Hedrick RN (offgoing nurse). Report included the following information SBAR, Kardex, Intake/Output, MAR and Recent Results.

## 2020-04-01 NOTE — PROGRESS NOTES
1915 Bedside and Verbal shift change report given to CHADWICK Albert RN (oncoming nurse) by Bienvenido Lora RN, DEANDRE Mae RN (offgoing nurse). Report included the following information SBAR, Kardex, Intake/Output, MAR and Recent Results. 1930 fundal check complete at this time. Pt passed small clot at this time. 2020 assisted mother with attempting to latch infant. Infant appears to be pretty sleepy. Provided mother with heating packs and encouraged mother to hand express at this time since infant if not latching for long. Mother currently hand expressing. Will call if assistance is needed. 2145 antonio removed at this time and patient walked to restroom. Dora care explained and performed. Linen changed. Mother assisted back to bed at this time. No other needs to be addressed. 18 mother called to have infant blood glucose checked prior to feed. Mother latched infant after glucose check. Will restart IV after feeding complete. 0030 vitals complete, fundal check complete at this time. infant returned to room. No needs to be addressed at this time. 0200 infant blood glucose checked and placed skin to skin with mom to promote good feeding. Mother will call if assistance needed with latching. 0415 pt in bed resting at this time. No needs to be addressed at this time. 0630 scheduled toradol given. No other needs to be addressed. 0725 Bedside and Verbal shift change report given to VISHAL Hedrick RN (oncoming nurse) by Igor Sims RN (offgoing nurse). Report included the following information SBAR, Kardex, Intake/Output, MAR and Recent Results.

## 2020-04-01 NOTE — PROGRESS NOTES
Problem:  Delivery: Day of Delivery  Goal: Activity/Safety  Outcome: Progressing Towards Goal  Goal: Nutrition/Diet  Outcome: Progressing Towards Goal  Goal: Discharge Planning  Outcome: Progressing Towards Goal  Goal: Medications  Outcome: Progressing Towards Goal  Goal: Respiratory  Outcome: Progressing Towards Goal  Goal: Treatments/Interventions/Procedures  Outcome: Progressing Towards Goal  Goal: Psychosocial  Outcome: Progressing Towards Goal  Goal: *Vital signs within defined limits  Outcome: Progressing Towards Goal  Goal: *Labs within defined limits  Outcome: Progressing Towards Goal  Goal: *Hemodynamically stable  Outcome: Progressing Towards Goal  Goal: *Optimal pain control at patient's stated goal  Outcome: Progressing Towards Goal  Goal: *Participates in infant care  Outcome: Progressing Towards Goal  Goal: *Demonstrates progressive activity  Outcome: Progressing Towards Goal  Goal: *Tolerating diet  Outcome: Progressing Towards Goal     Problem: Pain  Goal: *Control of Pain  Outcome: Progressing Towards Goal  Goal: *PALLIATIVE CARE:  Alleviation of Pain  Outcome: Progressing Towards Goal     Problem: Patient Education: Go to Patient Education Activity  Goal: Patient/Family Education  Outcome: Progressing Towards Goal     Problem: Falls - Risk of  Goal: *Absence of Falls  Description: Document Arlen Fall Risk and appropriate interventions in the flowsheet.   Outcome: Progressing Towards Goal  Note: Fall Risk Interventions:            Medication Interventions: Patient to call before getting OOB    Elimination Interventions: Call light in reach              Problem: Patient Education: Go to Patient Education Activity  Goal: Patient/Family Education  Outcome: Progressing Towards Goal

## 2020-04-01 NOTE — LACTATION NOTE
Per mom, baby wasn't nursing well last night, but has been nursing well this morning. Discussed frequent feeds and hand expression due to blood sugars being borderline. Mom verbalized understanding. 1030 Set mom up with double electric breast pump and educated on how to use initiation mode, pump hygiene, and safe milk storage due to infant having to supplement for blood sugar issues. Mom to feed infant at breast, pump q 3 hours for 15 minutes on initiation mode, and then supplement with EBM or formula. Mom verbalized understanding and no questions at this time. 80 Per mom, baby hasn't been feeding well today and is currently pumping. Will remain available.

## 2020-04-02 VITALS
HEIGHT: 64 IN | TEMPERATURE: 96.5 F | DIASTOLIC BLOOD PRESSURE: 57 MMHG | SYSTOLIC BLOOD PRESSURE: 104 MMHG | RESPIRATION RATE: 16 BRPM | BODY MASS INDEX: 27.14 KG/M2 | OXYGEN SATURATION: 98 % | HEART RATE: 76 BPM | WEIGHT: 159 LBS

## 2020-04-02 PROBLEM — O44.20 PARTIAL PREVIA: Status: RESOLVED | Noted: 2019-12-20 | Resolved: 2020-04-02

## 2020-04-02 LAB — PLATELET # BLD AUTO: 103 K/UL (ref 135–420)

## 2020-04-02 PROCEDURE — 74011250637 HC RX REV CODE- 250/637: Performed by: OBSTETRICS & GYNECOLOGY

## 2020-04-02 PROCEDURE — 36415 COLL VENOUS BLD VENIPUNCTURE: CPT

## 2020-04-02 PROCEDURE — 85049 AUTOMATED PLATELET COUNT: CPT

## 2020-04-02 RX ORDER — IBUPROFEN 800 MG/1
800 TABLET ORAL
Qty: 30 TAB | Refills: 1 | Status: SHIPPED | OUTPATIENT
Start: 2020-04-02 | End: 2020-05-21

## 2020-04-02 RX ORDER — OXYCODONE AND ACETAMINOPHEN 5; 325 MG/1; MG/1
1 TABLET ORAL
Qty: 12 TAB | Refills: 0 | Status: SHIPPED | OUTPATIENT
Start: 2020-04-02 | End: 2020-04-05

## 2020-04-02 RX ADMIN — IBUPROFEN 800 MG: 400 TABLET, FILM COATED ORAL at 00:26

## 2020-04-02 NOTE — DISCHARGE SUMMARY
Obstetrical Discharge Summary     Name: Yamilka Aguilar MRN: [id number]  SSN: [id number]    YOB: 1990  Age: 30 y.o. Sex: female      Admit Date: 3/31/2020    Discharge Date: 2020    Admitting Physician: Shavonne Richard MD     Attending Physician:  Quoc Abraham MD     Discharge Diagnoses:   Information for the patient's :  Jacqueline Douglas [[id number]]   Delivery of a 6 lb 8.1 oz (2.95 kg) male infant via , Low Transverse on 3/31/2020 at 9:29 AM  by Ekaterina Dumont. Apgars were 8  and 9 . Additional Diagnoses:   Problem List as of 2020 Date Reviewed: 2020          Codes Class Noted - Resolved    Placenta previa antepartum in third trimester ICD-10-CM: O44.03  ICD-9-CM: 641.13  3/31/2020 - Present        S/P  section ICD-10-CM: X01.758  ICD-9-CM: V45.89  3/31/2020 - Present        Pregnancy headache in second trimester ICD-10-CM: O26.892, R51  ICD-9-CM: 646.83, 784.0  2019 - Present        Polydactyly, fetal, affecting care of mother, antepartum ICD-10-CM: O35. 8XX0  ICD-9-CM: 655.83  2019 - Present    Overview Signed 2019  3:53 PM by Quoc Abraham MD     Patient also has hx. Supervision of normal pregnancy in first trimester ICD-10-CM: Z34.91  ICD-9-CM: V22.1  10/24/2019 - Present    Overview Signed 2019  3:55 PM by Quoc Abraham MD     Male, EFW 47% at morph             * (Principal) RESOLVED: Partial previa ICD-10-CM: O44.20  ICD-9-CM: 641.10  2019 - 2020    Overview Addendum 3/25/2020 12:17 PM by Meg Edge RN     Repeat scan 28 weeks  20 previa still present  3/16/20 5lb 8oz 69% previa still present. MFM referral sent-Appt 3/26/20 at 0930                   Lab Results   Component Value Date/Time    Rubella, External Immune 2019     Recent Labs     20  0335   HGB 11.0[facility] Course: Normal hospital course following the delivery.     Patient Instructions:   Current Discharge Medication List      START taking these medications    Details   ibuprofen (MOTRIN) 800 mg tablet Take 1 Tab by mouth every eight (8) hours as needed for Pain. Indications: pain  Qty: 30 Tab, Refills: 1      oxyCODONE-acetaminophen (PERCOCET) 5-325 mg per tablet Take 1 Tab by mouth every six (6) hours as needed for Pain for up to 3 days. Max Daily Amount: 4 Tabs. Qty: 12 Tab, Refills: 0    Associated Diagnoses: S/P  section         CONTINUE these medications which have NOT CHANGED    Details   acetaminophen (TYLENOL) 325 mg tablet Take  by mouth every four (4) hours as needed for Pain.      prenatal no. 44-iron,car-FA-dha (PRENATE MINI) 29 mg iron-1 mg -350 mg cap Take 1 Tab by mouth daily. Indications: LOT [id number] EXP 2019  Qty: 9 Cap, Refills: 0    Associated Diagnoses: Pregnancy examination or test, positive result             Reference my discharge instructions. Follow-up Appointments   Procedures    FOLLOW UP VISIT Appointment in: Two Weeks     Standing Status:   Standing     Number of Occurrences:   1     Order Specific Question:   Appointment in     Answer:    Two Weeks        Signed By:  Reji Tse MD     2020                       BST

## 2020-04-02 NOTE — LACTATION NOTE
Per mom, infant latching and nursing well today. Discussed continuing to latch, pump, and supplement until milk supply is established. Breastfeeding discharge teaching completed to include feeding on demand, foremilk and hindmilk importance, engorgement, mastitis, clogged ducts, pumping, breastmilk storage, and returning to work. Information given about unit and office phone numbers and encouraged mom to reach out if concerns arise, but that [facility] would be calling her in the next few days to follow up on breastfeeding. Mom verbalized understanding and no questions at this time.

## 2020-04-02 NOTE — PROGRESS NOTES
1925 Bedside and Verbal shift change report given to CHADWICK Barba RN (oncoming nurse) by VISHAL Hedrick RN (offgoing nurse). Report included the following information SBAR, Kardex, Intake/Output, MAR and Recent Results. 2010 mother signed consent RN witnessed. Infant brought to nursery for procedure. 2018 Dr. Griselda Gonzales spoke with parents about procedure. No further questions at this to  me.     0715 Bedside and Verbal shift change report given to CHAPO Diallo RN (oncoming nurse) by Victorine Boxer RN (offgoing nurse). Report included the following information SBAR, Kardex, Intake/Output, MAR and Recent Results.

## 2020-04-02 NOTE — PROGRESS NOTES
Post-Operative  Day 2    Edson Reyna       Assessment:   Hospital Problems  Date Reviewed: 3/31/2020          Codes Class Noted POA    S/P  section ICD-10-CM: Z98.891  ICD-9-CM: V45.89  3/31/2020 No        * (Principal) Partial previa ICD-10-CM: O44.20  ICD-9-CM: 641.10  2019 Yes    Overview Addendum 3/25/2020 12:17 PM by Shayna Gutiérrez RN     Repeat scan 28 weeks  20 previa still present  3/16/20 5lb 8oz 69% previa still present. MFM referral sent-Appt 3/26/20 at 0930                 Post-Op day 2, doing well    Plan:   1. Discharge home today  2. Follow up in office in 2 and 6 weeks with Haydee Martinez MD  3. Post partum activity/wound care advised, diet as tolerated  4. Discharge Medications: ibuprofen, percocet and medications prior to admission      Information for the patient's :  Alessia Garg [[id number]]   , Low Transverse   Patient doing well without significant complaint. Tolerating diet, passing flatus, voiding and ambulating without difficulty. Had a BM already. Baby is doing well. Current Facility-Administered Medications   Medication Dose Route Frequency    sodium chloride (NS) flush 5-40 mL  5-40 mL IntraVENous Q8H    oxytocin (PITOCIN) 20 units/1000 mL in 0.9% sodium chloride  125 mL/hr IntraVENous CONTINUOUS       Vitals:  Visit Vitals  /57 (BP 1 Location: Right arm, BP Patient Position: At rest)   Pulse 76   Temp 98.3 °F (36.8 °C)   Resp 16   Ht 5' 4\" (1.626 m)   Wt 159 lb (72.1 kg)   LMP 2019 (Approximate)   SpO2 98%   Breastfeeding Unknown   BMI 27.29 kg/m²     Temp (24hrs), Av.4 °F (36.9 °C), Min:98.3 °F (36.8 °C), Max:98.5 °F (36.9 °C)        Exam:        Patient without distress.                Abdomen, bowel sounds present, soft, expected tenderness, fundus firm                Wound incision clean, dry and intact               Lower extremities are negative for swelling, cords or tenderness. Labs:   Lab Results   Component Value Date/Time    WBC 8.6 04/01/2020 03:35 AM    WBC 6.3 03/31/2020 07:30 AM    WBC 8.8 12/24/2019 10:45 PM    HGB 11.0 (L) 04/01/2020 03:35 AM    HGB 11.9 (L) 03/31/2020 07:30 AM    HGB 11.2 02/07/2020 12:33 PM    HCT 33.9 (L) 04/01/2020 03:35 AM    HCT 36.4 03/31/2020 07:30 AM    HCT 36.7 12/24/2019 10:45 PM    PLATELET 99 (L) 1991 03:35 AM    PLATELET 356 (L) 34/71/2804 07:30 AM    PLATELET 981 65/92/3477 10:45 PM       No results found for this or any previous visit (from the past 24 hour(s)).

## 2020-04-02 NOTE — DISCHARGE INSTRUCTIONS
POST DELIVERY DISCHARGE INSTRUCTIONS    Name: Isael Dumont  YOB: 1990  Primary Diagnosis: Active Problems:    S/P  section (3/31/2020)    Call your obstetricians office as soon as possible to schedule an appointment for 2weeks postpartum check. General:     Diet/Diet Restrictions:  Eight 8-ounce glasses of fluid daily (water, juices); avoid excessive caffeine intake. Meals/snacks as desired which are high in fiber and carbohydrates and low in fat and cholesterol. Physical Activity / Restrictions / Safety:     Avoid heavy lifting, no more that 8 lbs. For 2-3 weeks; limit use of stairs to 2 times daily for the first week home. No driving for one week. Avoid intercourse 4-6 weeks, no douching or tampon use. Check with obstetrician before starting or resuming an exercise program.         Discharge Instructions/Special Treatment/Home Care Needs:     Continue prenatal vitamins. Continue to use squirt bottle with warm water on your episiotomy after each bathroom use until bleeding stops. If steri-strips applied to your incision, remove in 7-10 days. Call your doctor for the following:     Fever over 101 degrees by mouth. Vaginal bleeding heavier than a normal menstrual period or clot larger than a golf ball. Red streaks or increased swelling of legs, painful red streaks on your breast.  Painful urination, constipation and increased pain or swelling or discharge with your incision. If you feel extremely anxious or overwhelmed. If you have thoughts of harming yourself and/or your baby. Pain Management:     Pain Management:   Take Acetaminophen (Tylenol) or Ibuprofen (Advil, Motrin), as directed for pain. Use a warm Sitz bath 3 times daily to relieve episiotomy or hemorrhoidal discomfort. Heating pad to  incision as needed. For hemorrhoidal discomfort, use Tucks and Anusol cream as needed and directed. Follow-Up Care:      These are general instructions for a healthy lifestyle:    No smoking/ No tobacco products/ Avoid exposure to second hand smoke    Surgeon General's Warning:  Quitting smoking now greatly reduces serious risk to your health. Obesity, smoking, and sedentary lifestyle greatly increases your risk for illness    A healthy diet, regular physical exercise & weight monitoring are important for maintaining a healthy lifestyle    Recognize signs and symptoms of STROKE:    F-face looks uneven    A-arms unable to move or move unevenly    S-speech slurred or non-existent    T-time-call 911 as soon as signs and symptoms begin-DO NOT go       Back to bed or wait to see if you get better-TIME IS BRAIN.     Patient armband removed and shredded

## 2020-04-03 PROBLEM — O99.119 THROMBOCYTOPENIA AFFECTING PREGNANCY (HCC): Status: ACTIVE | Noted: 2020-04-03

## 2020-04-03 PROBLEM — D69.6 THROMBOCYTOPENIA AFFECTING PREGNANCY (HCC): Status: ACTIVE | Noted: 2020-04-03

## 2020-04-03 LAB
BACTERIA SPEC CULT: NORMAL
SERVICE CMNT-IMP: NORMAL

## 2020-04-04 LAB
ABO + RH BLD: NORMAL
BLD PROD TYP BPU: NORMAL
BLOOD BANK CMNT PATIENT-IMP: NORMAL
BLOOD GROUP ANTIBODIES SERPL: NORMAL
BPU ID: NORMAL
CROSSMATCH RESULT,%XM: NORMAL
SPECIMEN EXP DATE BLD: NORMAL
STATUS OF UNIT,%ST: NORMAL
UNIT DIVISION, %UDIV: 0

## (undated) DEVICE — DERMABOND SKIN ADH 0.7ML -- DERMABOND ADVANCED 12/BX

## (undated) DEVICE — BSMI CSECTION BIRTHING PACK: Brand: MEDLINE INDUSTRIES, INC.

## (undated) DEVICE — HEX-LOCKING BLADE ELECTRODE: Brand: EDGE

## (undated) DEVICE — SUTURE VCRL SZ 1 L36IN ABSRB UD L36MM CTB-1 1/2 CIR BLNT JB947

## (undated) DEVICE — SOL IRRIGATION INJ NACL 0.9% 500ML BTL

## (undated) DEVICE — STRAP,POSITIONING,KNEE/BODY,FOAM,4X60": Brand: MEDLINE

## (undated) DEVICE — INTENDED FOR TISSUE SEPARATION, AND OTHER PROCEDURES THAT REQUIRE A SHARP SURGICAL BLADE TO PUNCTURE OR CUT.: Brand: BARD-PARKER ® CARBON RIB-BACK BLADES

## (undated) DEVICE — LARGE, DISPOSABLE ALEXIS O C-SECTION PROTECTOR - RETRACTOR: Brand: ALEXIS ® O C-SECTION PROTECTOR - RETRACTOR

## (undated) DEVICE — GARMENT,MEDLINE,DVT,INT,CALF,MED, GEN2: Brand: MEDLINE

## (undated) DEVICE — APPLICATOR BNDG 1MM ADH PREMIERPRO EXOFIN

## (undated) DEVICE — STAPLER SKIN SQ 30 ABSRB STPL DISP INSORB

## (undated) DEVICE — REM POLYHESIVE ADULT PATIENT RETURN ELECTRODE: Brand: VALLEYLAB

## (undated) DEVICE — SUT VCRL + 2-0 36IN CT1 UD --

## (undated) DEVICE — 3L THIN WALL CAN: Brand: CRD